# Patient Record
Sex: FEMALE
[De-identification: names, ages, dates, MRNs, and addresses within clinical notes are randomized per-mention and may not be internally consistent; named-entity substitution may affect disease eponyms.]

---

## 2023-10-14 ENCOUNTER — NON-APPOINTMENT (OUTPATIENT)
Age: 63
End: 2023-10-14

## 2023-10-18 PROBLEM — Z00.00 ENCOUNTER FOR PREVENTIVE HEALTH EXAMINATION: Status: ACTIVE | Noted: 2023-10-18

## 2023-10-19 ENCOUNTER — APPOINTMENT (OUTPATIENT)
Dept: HEART AND VASCULAR | Facility: CLINIC | Age: 63
End: 2023-10-19
Payer: COMMERCIAL

## 2023-10-19 VITALS — SYSTOLIC BLOOD PRESSURE: 142 MMHG | DIASTOLIC BLOOD PRESSURE: 89 MMHG

## 2023-10-19 VITALS — HEIGHT: 65.5 IN | WEIGHT: 147 LBS | HEART RATE: 69 BPM | BODY MASS INDEX: 24.2 KG/M2 | OXYGEN SATURATION: 97 %

## 2023-10-19 DIAGNOSIS — Z82.49 FAMILY HISTORY OF ISCHEMIC HEART DISEASE AND OTHER DISEASES OF THE CIRCULATORY SYSTEM: ICD-10-CM

## 2023-10-19 DIAGNOSIS — E78.5 HYPERLIPIDEMIA, UNSPECIFIED: ICD-10-CM

## 2023-10-19 DIAGNOSIS — I10 ESSENTIAL (PRIMARY) HYPERTENSION: ICD-10-CM

## 2023-10-19 DIAGNOSIS — Z87.891 PERSONAL HISTORY OF NICOTINE DEPENDENCE: ICD-10-CM

## 2023-10-19 PROCEDURE — 99205 OFFICE O/P NEW HI 60 MIN: CPT | Mod: 25

## 2023-10-19 PROCEDURE — 93000 ELECTROCARDIOGRAM COMPLETE: CPT

## 2023-11-02 RX ORDER — AMLODIPINE BESYLATE 5 MG/1
5 TABLET ORAL DAILY
Qty: 30 | Refills: 2 | Status: DISCONTINUED | COMMUNITY
End: 2023-11-02

## 2023-11-10 ENCOUNTER — APPOINTMENT (OUTPATIENT)
Dept: HEART AND VASCULAR | Facility: CLINIC | Age: 63
End: 2023-11-10
Payer: COMMERCIAL

## 2023-11-10 VITALS
DIASTOLIC BLOOD PRESSURE: 90 MMHG | OXYGEN SATURATION: 98 % | HEIGHT: 65.5 IN | WEIGHT: 147 LBS | TEMPERATURE: 97.2 F | BODY MASS INDEX: 24.2 KG/M2 | SYSTOLIC BLOOD PRESSURE: 140 MMHG | HEART RATE: 57 BPM

## 2023-11-10 DIAGNOSIS — I34.1 NONRHEUMATIC MITRAL (VALVE) PROLAPSE: ICD-10-CM

## 2023-11-10 DIAGNOSIS — R06.09 OTHER FORMS OF DYSPNEA: ICD-10-CM

## 2023-11-10 PROCEDURE — 93306 TTE W/DOPPLER COMPLETE: CPT

## 2023-11-13 PROBLEM — I34.1 MVP (MITRAL VALVE PROLAPSE): Status: ACTIVE | Noted: 2023-11-13

## 2023-11-13 PROBLEM — R06.09 DYSPNEA ON EXERTION: Status: ACTIVE | Noted: 2023-10-19

## 2023-11-15 ENCOUNTER — TRANSCRIPTION ENCOUNTER (OUTPATIENT)
Age: 63
End: 2023-11-15

## 2023-11-17 ENCOUNTER — RESULT REVIEW (OUTPATIENT)
Age: 63
End: 2023-11-17

## 2023-11-17 ENCOUNTER — OUTPATIENT (OUTPATIENT)
Dept: OUTPATIENT SERVICES | Facility: HOSPITAL | Age: 63
LOS: 1 days | End: 2023-11-17
Payer: COMMERCIAL

## 2023-11-17 ENCOUNTER — APPOINTMENT (OUTPATIENT)
Dept: ULTRASOUND IMAGING | Facility: HOSPITAL | Age: 63
End: 2023-11-17

## 2023-11-17 ENCOUNTER — APPOINTMENT (OUTPATIENT)
Dept: CT IMAGING | Facility: HOSPITAL | Age: 63
End: 2023-11-17

## 2023-11-17 LAB
POCT ISTAT CREATININE: 1 MG/DL — SIGNIFICANT CHANGE UP (ref 0.5–1.3)
POCT ISTAT CREATININE: 1 MG/DL — SIGNIFICANT CHANGE UP (ref 0.5–1.3)

## 2023-11-17 PROCEDURE — 76775 US EXAM ABDO BACK WALL LIM: CPT | Mod: 26,59

## 2023-11-17 PROCEDURE — 76775 US EXAM ABDO BACK WALL LIM: CPT

## 2023-11-17 PROCEDURE — 82565 ASSAY OF CREATININE: CPT

## 2023-11-17 PROCEDURE — 93976 VASCULAR STUDY: CPT | Mod: 26

## 2023-11-17 PROCEDURE — 93976 VASCULAR STUDY: CPT

## 2023-11-17 PROCEDURE — 75574 CT ANGIO HRT W/3D IMAGE: CPT | Mod: 26

## 2023-11-17 PROCEDURE — 75574 CT ANGIO HRT W/3D IMAGE: CPT

## 2023-11-20 ENCOUNTER — RESULT REVIEW (OUTPATIENT)
Age: 63
End: 2023-11-20

## 2023-11-20 ENCOUNTER — OUTPATIENT (OUTPATIENT)
Dept: OUTPATIENT SERVICES | Facility: HOSPITAL | Age: 63
LOS: 1 days | End: 2023-11-20
Payer: COMMERCIAL

## 2023-11-20 ENCOUNTER — APPOINTMENT (OUTPATIENT)
Dept: HEART AND VASCULAR | Facility: CLINIC | Age: 63
End: 2023-11-20
Payer: COMMERCIAL

## 2023-11-20 VITALS — SYSTOLIC BLOOD PRESSURE: 140 MMHG | DIASTOLIC BLOOD PRESSURE: 86 MMHG

## 2023-11-20 VITALS
HEIGHT: 65.5 IN | OXYGEN SATURATION: 98 % | SYSTOLIC BLOOD PRESSURE: 155 MMHG | DIASTOLIC BLOOD PRESSURE: 94 MMHG | WEIGHT: 147 LBS | BODY MASS INDEX: 24.2 KG/M2 | HEART RATE: 73 BPM

## 2023-11-20 VITALS — DIASTOLIC BLOOD PRESSURE: 84 MMHG | SYSTOLIC BLOOD PRESSURE: 150 MMHG

## 2023-11-20 DIAGNOSIS — I25.10 ATHEROSCLEROTIC HEART DISEASE OF NATIVE CORONARY ARTERY W/OUT ANGINA PECTORIS: ICD-10-CM

## 2023-11-20 PROCEDURE — 0504T: CPT

## 2023-11-20 PROCEDURE — 99214 OFFICE O/P EST MOD 30 MIN: CPT | Mod: 25

## 2023-11-20 PROCEDURE — 0502T: CPT

## 2023-11-20 PROCEDURE — 0503T: CPT

## 2023-11-21 ENCOUNTER — NON-APPOINTMENT (OUTPATIENT)
Age: 63
End: 2023-11-21

## 2023-12-12 ENCOUNTER — APPOINTMENT (OUTPATIENT)
Dept: HEART AND VASCULAR | Facility: CLINIC | Age: 63
End: 2023-12-12
Payer: COMMERCIAL

## 2023-12-12 VITALS
HEART RATE: 73 BPM | WEIGHT: 147 LBS | SYSTOLIC BLOOD PRESSURE: 146 MMHG | HEIGHT: 65.5 IN | BODY MASS INDEX: 24.2 KG/M2 | OXYGEN SATURATION: 96 % | DIASTOLIC BLOOD PRESSURE: 73 MMHG | TEMPERATURE: 98.2 F

## 2023-12-12 VITALS — SYSTOLIC BLOOD PRESSURE: 110 MMHG | DIASTOLIC BLOOD PRESSURE: 80 MMHG

## 2023-12-12 PROCEDURE — 99214 OFFICE O/P EST MOD 30 MIN: CPT | Mod: 25

## 2023-12-12 PROCEDURE — 36415 COLL VENOUS BLD VENIPUNCTURE: CPT

## 2023-12-12 RX ORDER — ATORVASTATIN CALCIUM 10 MG/1
10 TABLET, FILM COATED ORAL
Qty: 30 | Refills: 3 | Status: DISCONTINUED | COMMUNITY
Start: 2023-11-20 | End: 2023-12-12

## 2023-12-13 LAB
ANION GAP SERPL CALC-SCNC: 14 MMOL/L
BUN SERPL-MCNC: 13 MG/DL
CALCIUM SERPL-MCNC: 10.2 MG/DL
CHLORIDE SERPL-SCNC: 103 MMOL/L
CO2 SERPL-SCNC: 24 MMOL/L
CREAT SERPL-MCNC: 0.83 MG/DL
EGFR: 79 ML/MIN/1.73M2
GLUCOSE SERPL-MCNC: 85 MG/DL
POTASSIUM SERPL-SCNC: 4.6 MMOL/L
SODIUM SERPL-SCNC: 142 MMOL/L

## 2023-12-15 LAB — APO LP(A) SERPL-MCNC: 391.5 NMOL/L

## 2024-01-19 ENCOUNTER — RX RENEWAL (OUTPATIENT)
Age: 64
End: 2024-01-19

## 2024-01-19 RX ORDER — AMLODIPINE BESYLATE 5 MG/1
5 TABLET ORAL DAILY
Qty: 90 | Refills: 1 | Status: ACTIVE | COMMUNITY
Start: 2023-11-02 | End: 1900-01-01

## 2024-02-13 ENCOUNTER — APPOINTMENT (OUTPATIENT)
Dept: HEART AND VASCULAR | Facility: CLINIC | Age: 64
End: 2024-02-13
Payer: COMMERCIAL

## 2024-02-13 ENCOUNTER — NON-APPOINTMENT (OUTPATIENT)
Age: 64
End: 2024-02-13

## 2024-02-13 PROCEDURE — 99214 OFFICE O/P EST MOD 30 MIN: CPT

## 2024-02-13 RX ORDER — EVOLOCUMAB 140 MG/ML
140 INJECTION, SOLUTION SUBCUTANEOUS
Qty: 2 | Refills: 5 | Status: ACTIVE | COMMUNITY
Start: 2024-02-13 | End: 1900-01-01

## 2024-04-29 ENCOUNTER — APPOINTMENT (OUTPATIENT)
Dept: ORTHOPEDIC SURGERY | Facility: CLINIC | Age: 64
End: 2024-04-29
Payer: COMMERCIAL

## 2024-04-29 VITALS — HEIGHT: 65 IN | WEIGHT: 147 LBS | BODY MASS INDEX: 24.49 KG/M2

## 2024-04-29 PROCEDURE — 20610 DRAIN/INJ JOINT/BURSA W/O US: CPT | Mod: 50

## 2024-04-29 PROCEDURE — 73564 X-RAY EXAM KNEE 4 OR MORE: CPT | Mod: LT,RT

## 2024-04-29 PROCEDURE — 99203 OFFICE O/P NEW LOW 30 MIN: CPT | Mod: 25

## 2024-04-29 NOTE — PHYSICAL EXAM
[de-identified] :   KNEE EXAM:   INSPECTION: SKIN-NONE SCARS-NONE TRAUMA-NONE ERYTHEMA-NONE SWELLING-NONE MUSCLE ATROPHY-NONE EFFUSION-NONE ASYMMETRY-NONE GAIT-(+)ANTALGIA MUSCLE WEAKNESS-Quads STANDING LIMB ALIGNMENT-(NEUTRAL)    PALPATION-Medial/lateral joint TTP, PF crepitus present  (-)PATELLA BALLOTING  (-)DEFORMITY   ROM: ACTIVE AND PASSIVE EXTENSION-FLEXION 0-125   NEUROVASCULAR GROSS MOTOR/SENSORY FXN-INTACT VASCULAR: PULSES 2+ POPLITEAL/DORSALIS PEDIS/POSTERIOR TIBIAL   SPECIAL TESTS  (-)LACHMAN'S TEST  (-)ANTERIOR DRAWER TEST   (-)VALGUS STRESS TEST  (-)VARUS STRESS TEST  (+)JAYY'S TEST  (-)AIDEE  (-)PATELLA APPREHENSION  (+)PATELLAR GRIND TEST  [de-identified] : x-rays of both knees taken today were reviewed by me and demonstrates KL 3 OA

## 2024-04-29 NOTE — HISTORY OF PRESENT ILLNESS
[Pain Location] : pain [] : right & left knee [de-identified] : AJ TAPIA is a 63 year old  F patient that presents today with B/L knee pain L>R. pt states she as a history of knee pain for several years now. pt has gotten cortisone and HA injections in the past which helped temporarily but over the last month her  knees started to bother her again and has gotten progressively worse. Pt. would like to discuss nonoperative treatments.

## 2024-04-29 NOTE — DISCUSSION/SUMMARY
[de-identified] : Impression: 62 y/o F with bilateral knee OA with L>R symptoms. Pain provoked by prolonged weightbearing activities.  Plan: Pt was offered and received bilateral knee steroid injections to address inflammation/pain. Injections were performed on this visit without complication. She will also start supervised physical therapy to improve Quads/Core/Glute strength and endurance.

## 2024-04-29 NOTE — PROCEDURE
[de-identified] : The left knee was prepped with alchohol and ethyl chloride was used to numb the skin. A 3 cc injection with 1 cc xylocaine, 1cc sensoricaine and 1 cc kenalog, was given without complication into the knee joint. Patient instructed that there may be an inflammatory flare for 24 hrs , to use ice or advil if needed  The right knee was prepped with alchohol and ethyl chloride was used to numb the skin. A 3 cc injection with 1 cc xylocaine, 1cc sensoricaine and 1 cc kenalog , was given without complication into the knee joint. Patient instructed that there may be an inflammatory flare for 24 hrs , to use ice or advil if needed

## 2024-05-16 ENCOUNTER — APPOINTMENT (OUTPATIENT)
Dept: HEART AND VASCULAR | Facility: CLINIC | Age: 64
End: 2024-05-16
Payer: COMMERCIAL

## 2024-05-16 VITALS
BODY MASS INDEX: 24.49 KG/M2 | TEMPERATURE: 97.2 F | OXYGEN SATURATION: 96 % | WEIGHT: 147 LBS | SYSTOLIC BLOOD PRESSURE: 154 MMHG | DIASTOLIC BLOOD PRESSURE: 83 MMHG | HEIGHT: 65 IN

## 2024-05-16 VITALS — SYSTOLIC BLOOD PRESSURE: 130 MMHG | DIASTOLIC BLOOD PRESSURE: 85 MMHG

## 2024-05-16 PROCEDURE — 93000 ELECTROCARDIOGRAM COMPLETE: CPT

## 2024-05-16 PROCEDURE — 99214 OFFICE O/P EST MOD 30 MIN: CPT | Mod: 25

## 2024-05-16 PROCEDURE — 36415 COLL VENOUS BLD VENIPUNCTURE: CPT

## 2024-05-16 RX ORDER — TELMISARTAN 40 MG/1
40 TABLET ORAL DAILY
Qty: 90 | Refills: 3 | Status: ACTIVE | COMMUNITY
Start: 2024-02-14 | End: 1900-01-01

## 2024-05-16 RX ORDER — ROSUVASTATIN CALCIUM 5 MG/1
5 TABLET, FILM COATED ORAL
Qty: 30 | Refills: 3 | Status: DISCONTINUED | COMMUNITY
Start: 2023-12-12 | End: 2024-05-16

## 2024-05-16 NOTE — DISCUSSION/SUMMARY
[Patient] : the patient [___ Month(s)] : in [unfilled] month(s) [FreeTextEntry1] : 62 y/o female with h/o mvp, cad, htn, hl, family h/o early cvd who presents for f/up today via telehealth   -CTA cor 11/23: Cardiac: 1. The calcium score is minimal at 5 Agatston units, which is at the 59 percentile, adjusted for age, gender and race. 2. Mild stenosis of the proximal and mid right coronary artery. 3. Mild stenosis of the proximal and mid left anterior descending coronary artery - distal LAD bridging Non-cardiac: 1. The visualized lungs are clear.   -serial echo to f/up MVP  -Echo 11/23: 1. Left ventricular cavity is normal. Left ventricular systolic function is normal with an ejection fraction of 65 % by Pace's method of disks. There are no regional wall motion abnormalities seen. 2. Normal left ventricular diastolic function. 3. Normal right ventricular cavity size, wall thickness, and systolic function. 4. No significant valvular disease. 5. Mild left ventricular hypertrophy. 6. Minimal Prolapse of both mitral leaflets. 7. No pericardial effusion seen.  -FREYA duplex 11/23: normal -ekg ordered today - nsr, rbbb, no st/t changes -labs 2023 reviewed, lipids ordered today -continue norvasc, telmisartan   -can consider secondary w/up htn, kuldeep eval in future -continue asa -order repatha given intolerance to lipitor/crestor and , CAD and LpA 391 -counseled on cvd risk factors -f/up 2 months for htn, hl  I have spent 30 minutes reviewing labs, records, tests and discussed cvd risk factors, htn, sob.   [EKG obtained to assist in diagnosis and management of assessed problem(s)] : EKG obtained to assist in diagnosis and management of assessed problem(s)

## 2024-05-16 NOTE — DISCUSSION/SUMMARY
[Patient] : the patient [___ Month(s)] : in [unfilled] month(s) [EKG obtained to assist in diagnosis and management of assessed problem(s)] : EKG obtained to assist in diagnosis and management of assessed problem(s) [FreeTextEntry1] : 64 y/o female with h/o mvp, cad, htn, hl, family h/o early cvd who presents for f/up today    -24 hour bp monitor ordered -CTA cor 11/23: Cardiac: 1. The calcium score is minimal at 5 Agatston units, which is at the 59 percentile, adjusted for age, gender and race. 2. Mild stenosis of the proximal and mid right coronary artery. 3. Mild stenosis of the proximal and mid left anterior descending coronary artery - distal LAD bridging Non-cardiac: 1. The visualized lungs are clear.   -serial echo to f/up MVP  -Echo 11/23: 1. Left ventricular cavity is normal. Left ventricular systolic function is normal with an ejection fraction of 65 % by Pace's method of disks. There are no regional wall motion abnormalities seen. 2. Normal left ventricular diastolic function. 3. Normal right ventricular cavity size, wall thickness, and systolic function. 4. No significant valvular disease. 5. Mild left ventricular hypertrophy. 6. Minimal Prolapse of both mitral leaflets. 7. No pericardial effusion seen.  -FREYA duplex 11/23: normal -ekg ordered today - nsr, rbbb, no st/t changes -labs 2023 reviewed, lipids ordered -continue norvasc , telmisartan 40 mg qd -can consider secondary w/up htn, kuldeep eval in future -continue asa -continue repatha given intolerance to lipitor/crestor and , CAD and LpA 391 -counseled on cvd risk factors -f/up 2 months for htn, hl  I have spent 30 minutes reviewing labs, records, tests and discussed cad, htn, cvd risk factors

## 2024-05-16 NOTE — HISTORY OF PRESENT ILLNESS
[FreeTextEntry1] : 62 y/o female with h/o cad, mvp, htn, hl, family h/o early cvd who presents for f/up today via telehealth    last seen  could not tolerate lipitor due to fatigue, muscle aches, mood changes changed to crestor 5 - today notes some continued muscle/joint discomfort  LpA elevated 391 last 's  no cp, sob no syncope, lh, palpitations, edema, orthopnea, pnd     -CTA cor : Cardiac: 1. The calcium score is minimal at 5 Agatston units, which is at the 59 percentile, adjusted for age, gender and race. 2. Mild stenosis of the proximal and mid right coronary artery. 3. Mild stenosis of the proximal and mid left anterior descending coronary artery - distal LAD bridging Non-cardiac: 1. The visualized lungs are clear.    Echo : 1. Left ventricular cavity is normal. Left ventricular systolic function is normal with an ejection fraction of 65 % by Pace's method of disks. There are no regional wall motion abnormalities seen. 2. Normal left ventricular diastolic function. 3. Normal right ventricular cavity size, wall thickness, and systolic function. 4. No significant valvular disease. 5. Mild left ventricular hypertrophy. 6. Minimal Prolapse of both mitral leaflets. 7. No pericardial effusion seen.  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

## 2024-05-16 NOTE — HISTORY OF PRESENT ILLNESS
[FreeTextEntry1] : 64 y/o female with h/o cad, mvp, htn, hl, family h/o early cvd who presents for f/up today     last seen  via telehealth  repatha started  could not tolerate lipitor due to fatigue, muscle aches, mood changes could not tolerate crestor - noted some continued muscle/joint discomfort  LpA elevated 391   no cp, sob no syncope, lh, palpitations, edema, orthopnea, pnd     -CTA cor : Cardiac: 1. The calcium score is minimal at 5 Agatston units, which is at the 59 percentile, adjusted for age, gender and race. 2. Mild stenosis of the proximal and mid right coronary artery. 3. Mild stenosis of the proximal and mid left anterior descending coronary artery - distal LAD bridging Non-cardiac: 1. The visualized lungs are clear.    Echo : 1. Left ventricular cavity is normal. Left ventricular systolic function is normal with an ejection fraction of 65 % by Pace's method of disks. There are no regional wall motion abnormalities seen. 2. Normal left ventricular diastolic function. 3. Normal right ventricular cavity size, wall thickness, and systolic function. 4. No significant valvular disease. 5. Mild left ventricular hypertrophy. 6. Minimal Prolapse of both mitral leaflets. 7. No pericardial effusion seen.  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

## 2024-05-17 LAB
CHOLEST SERPL-MCNC: 219 MG/DL
HDLC SERPL-MCNC: 104 MG/DL
LDLC SERPL CALC-MCNC: 101 MG/DL
NONHDLC SERPL-MCNC: 115 MG/DL
TRIGL SERPL-MCNC: 82 MG/DL

## 2024-06-19 ENCOUNTER — APPOINTMENT (OUTPATIENT)
Dept: ORTHOPEDIC SURGERY | Facility: CLINIC | Age: 64
End: 2024-06-19
Payer: COMMERCIAL

## 2024-06-19 DIAGNOSIS — M17.0 BILATERAL PRIMARY OSTEOARTHRITIS OF KNEE: ICD-10-CM

## 2024-06-19 PROCEDURE — 99213 OFFICE O/P EST LOW 20 MIN: CPT

## 2024-06-19 NOTE — DISCUSSION/SUMMARY
[de-identified] : SHe has been aaproved for HA injection however we will wait till at least another month before considering or wait till fall if symptoms do not persist.

## 2024-06-19 NOTE — HISTORY OF PRESENT ILLNESS
[de-identified] : Manasa had excellent relief from her injections and right knee remains fully asymptomatic. Her left knee with higher grade KL3 OA has some minor returning disocmfort nbut not bad enough to warrant re-injection yet.

## 2024-07-11 ENCOUNTER — APPOINTMENT (OUTPATIENT)
Dept: NEPHROLOGY | Facility: CLINIC | Age: 64
End: 2024-07-11
Payer: COMMERCIAL

## 2024-07-11 VITALS — SYSTOLIC BLOOD PRESSURE: 122 MMHG | HEART RATE: 66 BPM | DIASTOLIC BLOOD PRESSURE: 77 MMHG

## 2024-07-11 DIAGNOSIS — I10 ESSENTIAL (PRIMARY) HYPERTENSION: ICD-10-CM

## 2024-07-11 PROCEDURE — 93784 AMBL BP MNTR W/SOFTWARE: CPT

## 2024-07-25 ENCOUNTER — APPOINTMENT (OUTPATIENT)
Dept: ORTHOPEDIC SURGERY | Facility: CLINIC | Age: 64
End: 2024-07-25
Payer: COMMERCIAL

## 2024-07-25 PROCEDURE — 20610 DRAIN/INJ JOINT/BURSA W/O US: CPT | Mod: LT

## 2024-08-21 ENCOUNTER — TRANSCRIPTION ENCOUNTER (OUTPATIENT)
Age: 64
End: 2024-08-21

## 2024-09-30 ENCOUNTER — APPOINTMENT (OUTPATIENT)
Dept: ORTHOPEDIC SURGERY | Facility: CLINIC | Age: 64
End: 2024-09-30
Payer: MEDICARE

## 2024-09-30 DIAGNOSIS — M16.11 UNILATERAL PRIMARY OSTEOARTHRITIS, RIGHT HIP: ICD-10-CM

## 2024-09-30 DIAGNOSIS — M17.0 BILATERAL PRIMARY OSTEOARTHRITIS OF KNEE: ICD-10-CM

## 2024-09-30 PROCEDURE — 99203 OFFICE O/P NEW LOW 30 MIN: CPT

## 2024-09-30 RX ORDER — MELOXICAM 15 MG/1
15 TABLET ORAL
Qty: 30 | Refills: 2 | Status: ACTIVE | COMMUNITY
Start: 2024-09-30 | End: 1900-01-01

## 2024-09-30 NOTE — HISTORY OF PRESENT ILLNESS
[Pain Location] : pain [] : right & left knee [de-identified] : AJ CARNESO 64-year female Follow-up for bilateral knee pain after getting HA injection on 07/25/24. Pt states that after getting injection she has had only marginal improvement. She also states that she has been having right hip pain that started a month ago.  Pt states there has been no recent falls/trauma.

## 2024-09-30 NOTE — DISCUSSION/SUMMARY
[de-identified] : Impression: 63 y/o F with B/L knee OA refractory to recent round of HA treatment. Also has symptomatic OA of right hip.  Plan: Ms. Lopes would benefit from a course of PT for both knees and right hip. Appropriate prescription has been provided. We had a long discussion today about the risks and benefits of various [employer] injection procedures. These included HA, PRP, Amniotic Allograft product, Lipogems/lipoaspirate injections and BMAC. The fact that these treatments are investigational and not approved by any insurance product was also discussed.  We will see her for follow up in 6 weeks. If unimproved, we will consider treatment with PRP.

## 2024-09-30 NOTE — PHYSICAL EXAM
[de-identified] : Examination Hips: Neg log roll test +FADIR limited internal rotation Pain with terminal flexion + Hip flexor weakness  Non tender to palpation NVI  KNEE EXAM:  INSPECTION: SKIN-NONE SCARS-NONE TRAUMA-NONE ERYTHEMA-NONE SWELLING-NONE MUSCLE ATROPHY-NONE EFFUSION-NONE ASYMMETRY-NONE GAIT-(+)ANTALGIA MUSCLE WEAKNESS-Quads STANDING LIMB ALIGNMENT-(NEUTRAL)  PALPATION-Medial/lateral joint TTP, PF crepitus present (-)PATELLA BALLOTING (-)DEFORMITY  ROM: ACTIVE AND PASSIVE EXTENSION-FLEXION 0-125  NEUROVASCULAR GROSS MOTOR/SENSORY FXN-INTACT VASCULAR: PULSES 2+ POPLITEAL/DORSALIS PEDIS/POSTERIOR TIBIAL  SPECIAL TESTS (-)LACHMAN'S TEST (-)ANTERIOR DRAWER TEST (-)VALGUS STRESS TEST (-)VARUS STRESS TEST (+)JAYY'S TEST (-)AIDEE (-)PATELLA APPREHENSION (+)PATELLAR GRIND TEST  [de-identified] : AP pelvis and lateral of lumbar spine shows bilateral hip OA with evidence of narrowing of FA joint.

## 2024-11-20 ENCOUNTER — APPOINTMENT (OUTPATIENT)
Dept: ORTHOPEDIC SURGERY | Facility: CLINIC | Age: 64
End: 2024-11-20

## 2024-12-18 ENCOUNTER — NON-APPOINTMENT (OUTPATIENT)
Age: 64
End: 2024-12-18

## 2024-12-30 ENCOUNTER — TRANSCRIPTION ENCOUNTER (OUTPATIENT)
Age: 64
End: 2024-12-30

## 2024-12-31 ENCOUNTER — TRANSCRIPTION ENCOUNTER (OUTPATIENT)
Age: 64
End: 2024-12-31

## 2025-01-02 ENCOUNTER — TRANSCRIPTION ENCOUNTER (OUTPATIENT)
Age: 65
End: 2025-01-02

## 2025-01-02 DIAGNOSIS — E78.5 HYPERLIPIDEMIA, UNSPECIFIED: ICD-10-CM

## 2025-01-06 ENCOUNTER — TRANSCRIPTION ENCOUNTER (OUTPATIENT)
Age: 65
End: 2025-01-06

## 2025-01-07 ENCOUNTER — TRANSCRIPTION ENCOUNTER (OUTPATIENT)
Age: 65
End: 2025-01-07

## 2025-01-28 ENCOUNTER — TRANSCRIPTION ENCOUNTER (OUTPATIENT)
Age: 65
End: 2025-01-28

## 2025-01-29 ENCOUNTER — TRANSCRIPTION ENCOUNTER (OUTPATIENT)
Age: 65
End: 2025-01-29

## 2025-02-05 ENCOUNTER — APPOINTMENT (OUTPATIENT)
Dept: HEART AND VASCULAR | Facility: CLINIC | Age: 65
End: 2025-02-05

## 2025-03-04 ENCOUNTER — TRANSCRIPTION ENCOUNTER (OUTPATIENT)
Age: 65
End: 2025-03-04

## 2025-03-25 ENCOUNTER — APPOINTMENT (OUTPATIENT)
Dept: HEART AND VASCULAR | Facility: CLINIC | Age: 65
End: 2025-03-25
Payer: MEDICARE

## 2025-03-25 ENCOUNTER — NON-APPOINTMENT (OUTPATIENT)
Age: 65
End: 2025-03-25

## 2025-03-25 VITALS
BODY MASS INDEX: 24.66 KG/M2 | WEIGHT: 148 LBS | DIASTOLIC BLOOD PRESSURE: 82 MMHG | HEIGHT: 65 IN | OXYGEN SATURATION: 98 % | TEMPERATURE: 97.2 F | SYSTOLIC BLOOD PRESSURE: 128 MMHG | HEART RATE: 66 BPM

## 2025-03-25 PROCEDURE — 93000 ELECTROCARDIOGRAM COMPLETE: CPT

## 2025-03-25 PROCEDURE — G2211 COMPLEX E/M VISIT ADD ON: CPT

## 2025-03-25 PROCEDURE — 99204 OFFICE O/P NEW MOD 45 MIN: CPT

## 2025-04-07 ENCOUNTER — RX RENEWAL (OUTPATIENT)
Age: 65
End: 2025-04-07

## 2025-04-14 ENCOUNTER — APPOINTMENT (OUTPATIENT)
Dept: HEART AND VASCULAR | Facility: CLINIC | Age: 65
End: 2025-04-14
Payer: MEDICARE

## 2025-04-14 DIAGNOSIS — I51.7 CARDIOMEGALY: ICD-10-CM

## 2025-04-14 PROCEDURE — 93306 TTE W/DOPPLER COMPLETE: CPT

## 2025-04-15 PROBLEM — I51.7 LEFT VENTRICULAR HYPERTROPHY: Status: RESOLVED | Noted: 2025-04-15 | Resolved: 2025-04-15

## 2025-06-12 ENCOUNTER — APPOINTMENT (OUTPATIENT)
Dept: ORTHOPEDIC SURGERY | Facility: CLINIC | Age: 65
End: 2025-06-12
Payer: MEDICARE

## 2025-06-12 PROBLEM — M43.10 SPONDYLOLISTHESIS, GRADE 2: Status: ACTIVE | Noted: 2025-06-12

## 2025-06-12 PROCEDURE — 72100 X-RAY EXAM L-S SPINE 2/3 VWS: CPT

## 2025-06-12 PROCEDURE — 20610 DRAIN/INJ JOINT/BURSA W/O US: CPT | Mod: LT

## 2025-06-12 PROCEDURE — 99214 OFFICE O/P EST MOD 30 MIN: CPT | Mod: 25

## 2025-06-12 PROCEDURE — 72170 X-RAY EXAM OF PELVIS: CPT

## 2025-06-12 PROCEDURE — 73560 X-RAY EXAM OF KNEE 1 OR 2: CPT | Mod: LT

## 2025-06-12 RX ORDER — METHYLPREDNISOLONE 4 MG/1
4 TABLET ORAL
Qty: 1 | Refills: 0 | Status: ACTIVE | COMMUNITY
Start: 2025-06-12 | End: 1900-01-01

## 2025-06-12 RX ORDER — CELECOXIB 200 MG/1
200 CAPSULE ORAL
Qty: 30 | Refills: 2 | Status: ACTIVE | COMMUNITY
Start: 2025-06-12 | End: 1900-01-01

## 2025-06-19 ENCOUNTER — APPOINTMENT (OUTPATIENT)
Dept: HEART AND VASCULAR | Facility: CLINIC | Age: 65
End: 2025-06-19
Payer: MEDICARE

## 2025-06-19 PROCEDURE — G2211 COMPLEX E/M VISIT ADD ON: CPT | Mod: 93

## 2025-06-19 PROCEDURE — 99213 OFFICE O/P EST LOW 20 MIN: CPT | Mod: 93

## 2025-06-19 RX ORDER — EZETIMIBE 10 MG/1
10 TABLET ORAL
Qty: 90 | Refills: 1 | Status: ACTIVE | COMMUNITY
Start: 2025-06-19 | End: 1900-01-01

## 2025-09-17 ENCOUNTER — TRANSCRIPTION ENCOUNTER (OUTPATIENT)
Age: 65
End: 2025-09-17